# Patient Record
Sex: MALE | Race: WHITE
[De-identification: names, ages, dates, MRNs, and addresses within clinical notes are randomized per-mention and may not be internally consistent; named-entity substitution may affect disease eponyms.]

---

## 2018-05-17 ENCOUNTER — HOSPITAL ENCOUNTER (OUTPATIENT)
Dept: HOSPITAL 17 - HSDC | Age: 83
Discharge: HOME | End: 2018-05-17
Attending: SURGERY
Payer: MEDICARE

## 2018-05-17 VITALS
DIASTOLIC BLOOD PRESSURE: 86 MMHG | OXYGEN SATURATION: 96 % | TEMPERATURE: 97.4 F | SYSTOLIC BLOOD PRESSURE: 165 MMHG | HEART RATE: 80 BPM | RESPIRATION RATE: 16 BRPM

## 2018-05-17 VITALS — WEIGHT: 157.63 LBS | BODY MASS INDEX: 22.82 KG/M2 | HEIGHT: 69.6 IN

## 2018-05-17 DIAGNOSIS — R59.0: ICD-10-CM

## 2018-05-17 DIAGNOSIS — C44.229: ICD-10-CM

## 2018-05-17 DIAGNOSIS — Z72.0: ICD-10-CM

## 2018-05-17 DIAGNOSIS — Z79.01: ICD-10-CM

## 2018-05-17 DIAGNOSIS — I25.10: ICD-10-CM

## 2018-05-17 DIAGNOSIS — C77.0: Primary | ICD-10-CM

## 2018-05-17 DIAGNOSIS — I10: ICD-10-CM

## 2018-05-17 DIAGNOSIS — E03.9: ICD-10-CM

## 2018-05-17 DIAGNOSIS — Z98.61: ICD-10-CM

## 2018-05-17 LAB
ACANTHOCYTES BLD QL SMEAR: (no result)
BASOPHILS # BLD AUTO: 0.2 TH/MM3 (ref 0–0.2)
BASOPHILS NFR BLD AUTO: 1 % (ref 0–2)
BASOPHILS NFR BLD: 1 % (ref 0–2)
BUN SERPL-MCNC: 51 MG/DL (ref 7–18)
CALCIUM SERPL-MCNC: 8.8 MG/DL (ref 8.5–10.1)
CHLORIDE SERPL-SCNC: 111 MEQ/L (ref 98–107)
CREAT SERPL-MCNC: 2.37 MG/DL (ref 0.6–1.3)
EOSINOPHIL # BLD: 0.7 TH/MM3 (ref 0–0.4)
EOSINOPHIL NFR BLD: 4.1 % (ref 0–4)
ERYTHROCYTE [DISTWIDTH] IN BLOOD BY AUTOMATED COUNT: 18.9 % (ref 11.6–17.2)
GFR SERPLBLD BASED ON 1.73 SQ M-ARVRAT: 26 ML/MIN (ref 89–?)
GLUCOSE SERPL-MCNC: 86 MG/DL (ref 74–106)
HCO3 BLD-SCNC: 21.3 MEQ/L (ref 21–32)
HCT VFR BLD CALC: 44.2 % (ref 39–51)
HGB BLD-MCNC: 13.5 GM/DL (ref 13–17)
INR PPP: 1.3 RATIO
LYMPHOCYTES # BLD AUTO: 2.1 TH/MM3 (ref 1–4.8)
LYMPHOCYTES NFR BLD AUTO: 12.7 % (ref 9–44)
LYMPHOCYTES: 17 % (ref 9–44)
MCH RBC QN AUTO: 20.9 PG (ref 27–34)
MCHC RBC AUTO-ENTMCNC: 30.5 % (ref 32–36)
MCV RBC AUTO: 68.4 FL (ref 80–100)
MONOCYTE #: 2.4 TH/MM3 (ref 0–0.9)
MONOCYTES NFR BLD: 14.4 % (ref 0–8)
MONOCYTES: 14 % (ref 0–8)
MYELOCYTES NFR BLD: 1 % (ref 0–0)
NEUTROPHILS # BLD AUTO: 11.2 TH/MM3 (ref 1.8–7.7)
NEUTROPHILS NFR BLD AUTO: 67.8 % (ref 16–70)
NEUTS BAND # BLD MANUAL: 11 TH/MM3 (ref 1.8–7.7)
NEUTS BAND NFR BLD: 15 % (ref 0–6)
NEUTS SEG NFR BLD MANUAL: 50 % (ref 16–70)
OVALOCYTES BLD QL SMEAR: (no result)
PLATELET # BLD: 654 TH/MM3 (ref 150–450)
PMV BLD AUTO: 9.5 FL (ref 7–11)
PROTHROMBIN TIME: 13.1 SEC (ref 9.8–11.6)
RBC # BLD AUTO: 6.47 MIL/MM3 (ref 4.5–5.9)
SODIUM SERPL-SCNC: 144 MEQ/L (ref 136–145)
WBC # BLD AUTO: 16.6 TH/MM3 (ref 4–11)

## 2018-05-17 PROCEDURE — 10035 PLMT SFT TISS LOCLZJ DEV 1ST: CPT

## 2018-05-17 PROCEDURE — 85007 BL SMEAR W/DIFF WBC COUNT: CPT

## 2018-05-17 PROCEDURE — 93005 ELECTROCARDIOGRAM TRACING: CPT

## 2018-05-17 PROCEDURE — 85610 PROTHROMBIN TIME: CPT

## 2018-05-17 PROCEDURE — 80048 BASIC METABOLIC PNL TOTAL CA: CPT

## 2018-05-17 PROCEDURE — 00320 ANES ALL PX NECK NOS 1YR/>: CPT

## 2018-05-17 PROCEDURE — 88305 TISSUE EXAM BY PATHOLOGIST: CPT

## 2018-05-17 PROCEDURE — 88307 TISSUE EXAM BY PATHOLOGIST: CPT

## 2018-05-17 PROCEDURE — 38510 BIOPSY/REMOVAL LYMPH NODES: CPT

## 2018-05-17 PROCEDURE — 85027 COMPLETE CBC AUTOMATED: CPT

## 2018-05-17 NOTE — MP
cc:

Raj Santos MD

****

 

 

DATE OF OPERATION:

05/17/2018

 

PREOPERATIVE DIAGNOSIS:

1.  Left cervical level 5 mass highly suspicious for metastatic 

squamous cell carcinoma.

2.  History of multiple skin malignancies including recurrent squamous

cell carcinoma of the left ear.

 

'POSTOPERATIVE DIAGNOSIS:

1.  Left cervical level 5 mass, highly suspicious for metastatic 

squamous cell carcinoma.

2.  History of multiple skin malignancies including recurrent squamous

cell carcinoma of the left ear.

 

PROCEDURE PERFORMED:

1.  Excision of 3 cm level 5 left cervical mass.

2.  Placement of radiologic markers for planned postoperative 

radiation.

 

ATTENDING SURGEON:

Raj Santos MD.

 

ASSISTANT:

Staff.

 

ANESTHESIA:

General and local anesthetic.

 

COMPLICATIONS:

None.

 

FINDINGS:

A 3 cm well defined mass consistent with metastatic squamous cell 

carcinoma almost completely intramuscular from the proximal 

sternocleidomastoid just proximal to the insertion at the mastoid 

process.  No other major structures involved.  No other suspicious 

lymphadenopathy level V.

 

INDICATIONS FOR THE PROCEDURE:

The patient is an 86-year-old male with recurrent squamous cell 

carcinoma of the left ear.  The patient developed a palpable left 

level 5 cervical lymph node suspicious for metastatic carcinoma.  The 

patient was recommended to undergo radiation; however, due to the 

large size of this metastasis, this is concerning that radiation would

not gain adequate local control.  Dissection of the moisés basin or at 

least debulking of this lesion was indicated for local regional 

control.  Risks, benefits, and alternatives to surgery including 

resection of the mass, as well as a selective dissection were 

discussed with the patient and his wife prior to the procedure.  The 

patient has been on Plavix and Eliquis and these were held, according 

to the patient's cardiologist preoperatively.

 

PROCEDURE:

The patient was taken to the operating room , placed in the supine 

position and placed under anesthesia with an LMA airway.  The 

patient's left posterior neck and face were shaved, prepped and draped

in a sterile fashion.  Time-out was performed.  Local anesthetic was 

instilled over the area of the palpable mass at the proximal level 5 

cervical moisés basin.  An approximately 4 cm incision was made 

vertically over the area of the mass to the posterior edge of the SCM.

 I used the Bovie electrocautery to dissect the subcutaneous tissue 

and open platysmal flaps.  We were able to use a hemostat, as well as 

right angle dissection to dissect around the mass.  Approximately 75% 

of the width of the SCM was involved in this mass and the majority of 

the dissection was involved with dividing SCM fibers proximal and 

distal to the mass.  There was no involvement in any major vascular or

neurologic structures of the neck and this may have come from a level 

5 lymph node as the only space that this was involved in was to level 

V.  We used careful dissection and the spinal accessory nerve was 

actually not visualized as the full compartment was not dissected.  

The mass was 3 cm grossly upon removal and was passed off for 

permanent processing.  We had excellent hemostasis.  We did place some

Hemoclips in the space at the resection margins.  We irrigated out 

until all succinate was clear.  We placed Surgicel at the muscle edges

to assist with hemostasis.  We closed the platysma with interrupted 

3-0.  We closed the skin with 4-0 Monocryl and Dermabond.

 

At this point in time, the patient was discontinued from anesthesia 

and taken to the PACU in stable condition.  The patient tolerated the 

procedure well with no apparent complications.  All counts were 

correct and I was present and scrubbed for the entire procedure.

 

 

 

__________________________________

Raj Santos MD

 

 

AWG/DL

D: 05/17/2018, 11:31 AM

T: 05/17/2018, 12:06 PM

Visit #: [id number]

Job #: [id number]

## 2018-05-18 NOTE — EKG
Date Performed: 05/17/2018       Time Performed: 08:30:07

 

PTAGE:      86 years

 

EKG:      Sinus rhythm 

 

 WITH FIRST DEGREE AV BLOCK WITH OCCASIONAL SUPRAVENTRICULAR PREMATURE COMPLEXES INFERIOR MYOCARDIAL 
INFARCTION , PROBABLY OLD ABNORMAL ECG 

 

NO PREVIOUS TRACING            

 

DOCTOR:   Boom Sabillon  Interpretating Date/Time  05/18/2018 11:44:23